# Patient Record
Sex: FEMALE | Race: BLACK OR AFRICAN AMERICAN | Employment: UNEMPLOYED | URBAN - METROPOLITAN AREA
[De-identification: names, ages, dates, MRNs, and addresses within clinical notes are randomized per-mention and may not be internally consistent; named-entity substitution may affect disease eponyms.]

---

## 2017-02-06 ENCOUNTER — DOCUMENTATION ONLY (OUTPATIENT)
Dept: FAMILY MEDICINE CLINIC | Age: 47
End: 2017-02-06

## 2017-02-06 NOTE — PROGRESS NOTES
Pt arrived with her SNAP form. Completed it as she is unable to work with her uncontrolled pain. Never received word regarding PAP application and methotrexate was DCd. Pt on no meds at this time. Has f/u with Dr Orly Cardoza in 2 days. I encouraged her to stay and complete PAP application for enbrel and leave with staff so we can fax form for her. She was in agreement.

## 2017-02-20 ENCOUNTER — APPOINTMENT (OUTPATIENT)
Dept: GENERAL RADIOLOGY | Age: 47
End: 2017-02-20
Attending: EMERGENCY MEDICINE
Payer: SELF-PAY

## 2017-02-20 ENCOUNTER — HOSPITAL ENCOUNTER (EMERGENCY)
Age: 47
Discharge: HOME | End: 2017-02-20
Attending: EMERGENCY MEDICINE
Payer: SELF-PAY

## 2017-02-20 VITALS
DIASTOLIC BLOOD PRESSURE: 110 MMHG | RESPIRATION RATE: 18 BRPM | OXYGEN SATURATION: 99 % | SYSTOLIC BLOOD PRESSURE: 180 MMHG | HEART RATE: 86 BPM | HEIGHT: 67 IN | BODY MASS INDEX: 45.99 KG/M2 | WEIGHT: 293 LBS | TEMPERATURE: 98.2 F

## 2017-02-20 DIAGNOSIS — M19.031 ARTHRITIS OF WRIST, RIGHT: Primary | ICD-10-CM

## 2017-02-20 PROCEDURE — 73130 X-RAY EXAM OF HAND: CPT

## 2017-02-20 PROCEDURE — 74011250637 HC RX REV CODE- 250/637: Performed by: EMERGENCY MEDICINE

## 2017-02-20 PROCEDURE — 99283 EMERGENCY DEPT VISIT LOW MDM: CPT

## 2017-02-20 RX ORDER — OXYCODONE AND ACETAMINOPHEN 5; 325 MG/1; MG/1
1 TABLET ORAL
Status: COMPLETED | OUTPATIENT
Start: 2017-02-20 | End: 2017-02-20

## 2017-02-20 RX ORDER — OXYCODONE AND ACETAMINOPHEN 5; 325 MG/1; MG/1
TABLET ORAL
Qty: 12 TAB | Refills: 0 | Status: SHIPPED | OUTPATIENT
Start: 2017-02-20 | End: 2017-07-24

## 2017-02-20 RX ADMIN — OXYCODONE HYDROCHLORIDE AND ACETAMINOPHEN 1 TABLET: 5; 325 TABLET ORAL at 16:51

## 2017-02-20 NOTE — ED TRIAGE NOTES
Injury to right hand where purse strap got caught on thumb pulling on thumb. Also c/o right knee pain and swelling and right foot pain and swelling. States hx arthritis, unknown gout.

## 2017-02-20 NOTE — ED NOTES
Current Discharge Medication List      CONTINUE these medications which have CHANGED    Details   oxyCODONE-acetaminophen (PERCOCET) 5-325 mg per tablet Take 1 tablet every 4-6 hours as needed for pain control. If you were instructed to try over the counter ibuprofen or tylenol, only take the percocet for pain not controlled with the over the counter medication. Qty: 12 Tab, Refills: 0         CONTINUE these medications which have NOT CHANGED    Details   ! ! predniSONE (DELTASONE) 20 mg tablet Take 3 tabs by mouth daily for 5 days  Qty: 15 Tab, Refills: 0      ergocalciferol (VITAMIN D2) 50,000 unit capsule Take 1 Cap by mouth every seven (7) days. Indications: VITAMIN D DEFICIENCY (HIGH DOSE THERAPY)  Qty: 4 Cap, Refills: 1    Associated Diagnoses: Vitamin D deficiency      ! ! predniSONE (DELTASONE) 10 mg tablet Take 1 Tab by mouth daily (with breakfast). Qty: 10 Tab, Refills: 0      folic acid (FOLVITE) 1 mg tablet Take 1 Tab by mouth daily. Qty: 30 Tab, Refills: 5      Olmesartan-amLODIPine-HCTZ 20-5-12.5 mg tab Take  by mouth. !! - Potential duplicate medications found. Please discuss with provider.         Patient armband removed and shredded prescription given and reviewed with shawn

## 2017-02-20 NOTE — ED PROVIDER NOTES
HPI Comments: 3:12 PM Bunny Mckay is a 55 y.o. female with hx of HTN and arthritis who presents to the Emergency Department with c/o R hand pain and swelling. Pt reports some initial swelling in her R hand but states she grabbed her pocketbook and pulled her fingers backwards which has caused increased swelling and pain. Pt also c/o some R knee and foot pain onset today today but states she has experienced some stiffness in her joints prior due to hx of RA. Denies any recent injuries or trauma. No other complaints at this time. Patient is a 55 y.o. female presenting with knee pain, foot pain, and hand pain. The history is provided by the patient. Knee Pain    Pertinent negatives include no back pain. Foot Pain    Pertinent negatives include no back pain. Hand Pain    Pertinent negatives include no back pain. Past Medical History:   Diagnosis Date    Arthritis     Hypertension        Past Surgical History:   Procedure Laterality Date    Hx appendectomy  1998    Pr excise breast duct fistula  [id number]    Hx gyn       c/s    Hx heent       jaw abscess         Family History:   Problem Relation Age of Onset    Hypertension Father     Diabetes Maternal Aunt     Diabetes Paternal Aunt        Social History     Social History    Marital status: LEGALLY      Spouse name: N/A    Number of children: N/A    Years of education: N/A     Occupational History    Not on file. Social History Main Topics    Smoking status: Current Every Day Smoker     Packs/day: 0.25     Types: Cigarettes    Smokeless tobacco: Never Used    Alcohol use No    Drug use: No    Sexual activity: No     Other Topics Concern    Not on file     Social History Narrative         ALLERGIES: Iodinated contrast media - oral and iv dye; Norco [hydrocodone-acetaminophen]; and Penicillins    Review of Systems   Constitutional: Negative for chills, fatigue, fever and unexpected weight change.    HENT: Negative for congestion and rhinorrhea. Respiratory: Negative for chest tightness and shortness of breath. Cardiovascular: Negative for chest pain, palpitations and leg swelling. Gastrointestinal: Negative for abdominal pain, nausea and vomiting. Genitourinary: Negative for dysuria. Musculoskeletal: Positive for arthralgias (R hand, R knee and foot). Negative for back pain. Skin: Negative for rash. Neurological: Negative for dizziness and weakness. Psychiatric/Behavioral: The patient is not nervous/anxious. Vitals:    02/20/17 1328   BP: (!) 180/110   Pulse: 86   Resp: 18   Temp: 98.2 °F (36.8 °C)   SpO2: 99%   Weight: 145.2 kg (320 lb)   Height: 5' 7\" (1.702 m)            Physical Exam   Constitutional: She is oriented to person, place, and time. She appears well-developed and well-nourished. No distress. HENT:   Head: Normocephalic and atraumatic. Right Ear: External ear normal.   Left Ear: External ear normal.   Nose: Nose normal.   Mouth/Throat: Oropharynx is clear and moist.   Eyes: Conjunctivae and EOM are normal. Pupils are equal, round, and reactive to light. No scleral icterus. Neck: Normal range of motion. Neck supple. No JVD present. No tracheal deviation present. No thyromegaly present. Cardiovascular: Normal rate, regular rhythm, normal heart sounds and intact distal pulses. Exam reveals no gallop and no friction rub. No murmur heard. Pulmonary/Chest: Effort normal and breath sounds normal. She exhibits no tenderness. Abdominal: Soft. Bowel sounds are normal. She exhibits no distension. There is no tenderness. There is no rebound and no guarding. Musculoskeletal: Normal range of motion. R hand mildly diffuse TTP dorsally with mild swelling     Lymphadenopathy:     She has no cervical adenopathy. Neurological: She is alert and oriented to person, place, and time. No cranial nerve deficit.  Coordination normal.   Normal neurovascular exam in the fingers of R hand   No sensory loss, Gait normal, Motor 5/5   Skin: Skin is warm and dry. Psychiatric: She has a normal mood and affect. Her behavior is normal. Judgment and thought content normal.   Nursing note and vitals reviewed. MDM  Number of Diagnoses or Management Options  Diagnosis management comments: Results reviewed with pt, she agrees with dispo and F/U plan. Walter Arriola MD  4:20 PM         Amount and/or Complexity of Data Reviewed  Tests in the radiology section of CPT®: ordered and reviewed      ED Course       Procedures    Vitals:  Patient Vitals for the past 12 hrs:   Temp Pulse Resp BP SpO2   02/20/17 1328 98.2 °F (36.8 °C) 86 18 (!) 180/110 99 %       Medications ordered:   Medications - No data to display      Lab findings:  No results found for this or any previous visit (from the past 12 hour(s)). X-Ray, CT or other radiology findings or impressions:  XR HAND RT MIN 3 V   Final Result   EXAMINATION: Right hand 3 views     INDICATION: Right hand pain, swelling, no reported injury, reported rheumatoid  arthritis     COMPARISON: None     FINDINGS: 3 views of the right hand obtained. No acute fracture or subluxation. Joint spaces maintained. Equivocal erosion at the base of the fifth intercarpal  and base of the second middle phalanx. No soft tissue calcifications identified. Diffusely prominent soft tissues.     IMPRESSION  IMPRESSION:        1. No acute osseous findings. See details above. Progress notes, Consult notes or additional Procedure notes:   Discussed plans to d/c. All questions addressed. Disposition:  Diagnosis: No diagnosis found. Disposition: Discharge    Follow-up Information     None           Patient's Medications   Start Taking    No medications on file   Continue Taking    ERGOCALCIFEROL (VITAMIN D2) 50,000 UNIT CAPSULE    Take 1 Cap by mouth every seven (7) days.  Indications: VITAMIN D DEFICIENCY (HIGH DOSE THERAPY)    FOLIC ACID (FOLVITE) 1 MG TABLET Take 1 Tab by mouth daily. OLMESARTAN-AMLODIPINE-HCTZ (TRIBENZOR) 20-5-12.5 MG TAB    Take  by mouth. OLMESARTAN-AMLODIPINE-HCTZ 20-5-12.5 MG TAB    Take  by mouth. OXYCODONE-ACETAMINOPHEN (PERCOCET) 5-325 MG PER TABLET    Take 1 Tab by mouth every six (6) hours as needed for Pain. Max Daily Amount: 4 Tabs. PREDNISONE (DELTASONE) 10 MG TABLET    Take 1 Tab by mouth daily (with breakfast). PREDNISONE (DELTASONE) 20 MG TABLET    Take 3 tabs by mouth daily for 5 days   These Medications have changed    No medications on file   Stop Taking    No medications on file       Scribe Attestation  I, Segundo Mendieta, am scribing for, and in the presence of Lorraine Mcneill MD, 02/20/17, 3:20 PM    Signed by: Miladys Vasquez, 02/20/17, 3:20 PM    Physician Attestation  I personally performed the services described in the documentation, reviewed the documentation, as recorded by the scribe in my presence, and it accurately and completely records my words and actions.     Lorraine Mcneill MD

## 2017-02-20 NOTE — DISCHARGE INSTRUCTIONS
Arthritis: Care Instructions  Your Care Instructions  Arthritis, also called osteoarthritis, is a breakdown of the cartilage that cushions your joints. When the cartilage wears down, your bones rub against each other. This causes pain and stiffness. Many people have some arthritis as they age. Arthritis most often affects the joints of the spine, hands, hips, knees, or feet. You can take simple measures to protect your joints, ease your pain, and help you stay active. Follow-up care is a key part of your treatment and safety. Be sure to make and go to all appointments, and call your doctor if you are having problems. It's also a good idea to know your test results and keep a list of the medicines you take. How can you care for yourself at home? · Stay at a healthy weight. Being overweight puts extra strain on your joints. · Talk to your doctor or physical therapist about exercises that will help ease joint pain. ¨ Stretch. You may enjoy gentle forms of yoga to help keep your joints and muscles flexible. ¨ Walk instead of jog. Other types of exercise that are less stressful on the joints include riding a bicycle, swimming, or water exercise. ¨ Lift weights. Strong muscles help reduce stress on your joints. Stronger thigh muscles, for example, take some of the stress off of the knees and hips. Learn the right way to lift weights so you do not make joint pain worse. · Take your medicines exactly as prescribed. Call your doctor if you think you are having a problem with your medicine. · Take pain medicines exactly as directed. ¨ If the doctor gave you a prescription medicine for pain, take it as prescribed. ¨ If you are not taking a prescription pain medicine, ask your doctor if you can take an over-the-counter medicine. · Use a cane, crutch, walker, or another device if you need help to get around. These can help rest your joints.  You also can use other things to make life easier, such as a higher toilet seat and padded handles on kitchen utensils. · Do not sit in low chairs, which can make it hard to get up. · Put heat or cold on your sore joints as needed. Use whichever helps you most. You also can take turns with hot and cold packs. ¨ Apply heat 2 or 3 times a day for 20 to 30 minutes--using a heating pad, hot shower, or hot pack--to relieve pain and stiffness. ¨ Put ice or a cold pack on your sore joint for 10 to 20 minutes at a time. Put a thin cloth between the ice and your skin. When should you call for help? Call your doctor now or seek immediate medical care if:  · You have sudden swelling, warmth, or pain in any joint. · You have joint pain and a fever or rash. · You have such bad pain that you cannot use a joint. Watch closely for changes in your health, and be sure to contact your doctor if:  · You have mild joint symptoms that continue even with more than 6 weeks of care at home. · You have stomach pain or other problems with your medicine. Where can you learn more? Go to http://ashley-gunjan.info/. Enter X710 in the search box to learn more about \"Arthritis: Care Instructions. \"  Current as of: February 24, 2016  Content Version: 11.1  © 4712-1629 [employer]. Care instructions adapted under license by [employer] (which disclaims liability or warranty for this information). If you have questions about a medical condition or this instruction, always ask your healthcare professional. [place] [address] any warranty or liability for your use of this information.

## 2017-02-22 ENCOUNTER — OFFICE VISIT (OUTPATIENT)
Dept: FAMILY MEDICINE CLINIC | Age: 47
End: 2017-02-22

## 2017-02-22 VITALS
TEMPERATURE: 98.2 F | RESPIRATION RATE: 18 BRPM | OXYGEN SATURATION: 96 % | SYSTOLIC BLOOD PRESSURE: 164 MMHG | BODY MASS INDEX: 45.99 KG/M2 | DIASTOLIC BLOOD PRESSURE: 107 MMHG | WEIGHT: 293 LBS | HEART RATE: 96 BPM | HEIGHT: 67 IN

## 2017-02-22 DIAGNOSIS — M06.9 RHEUMATOID ARTHRITIS INVOLVING MULTIPLE SITES, UNSPECIFIED RHEUMATOID FACTOR PRESENCE: Primary | ICD-10-CM

## 2017-02-22 RX ORDER — PREDNISONE 20 MG/1
TABLET ORAL
Qty: 60 TAB | Refills: 1 | Status: SHIPPED | OUTPATIENT
Start: 2017-02-22 | End: 2017-07-24

## 2017-02-22 NOTE — PROGRESS NOTES
HISTORY OF PRESENT ILLNESS  Whitney Domínguez is a 55 y.o. female. Swelling   The history is provided by the patient. This is a chronic problem. Episode onset: Presents for f/u of rheumatoid arthritis. Recently, she has been having worsening R arm pain and R knee pain. She was seen in the ED and prescribed percocet w/o significant improvement. + swelling. Episode frequency: She is off methotrexate and last took prednisone in November. The problem has been gradually worsening. Nothing aggravates the symptoms. Nothing relieves the symptoms. ROS    Physical Exam   Constitutional: She appears well-developed and well-nourished. HENT:   Head: Normocephalic and atraumatic. Musculoskeletal: She exhibits edema and tenderness. Reduced ROM of R hand       ASSESSMENT and PLAN  RA:  Will start course of prednisone tapering to 20mg. Once symptoms are controlled, will leave her at 20mg/daily for now.

## 2017-02-22 NOTE — PROGRESS NOTES
No chief complaint on file. 1. Have you been to the ER, urgent care clinic since your last visit? Hospitalized since your last visit? Yes Where: [facility]    2. Have you seen or consulted any other health care providers outside of the [address] [place] [address] since your last visit? No    3. When was your last Mammogram, Pap smear, and/or Colon screening? Mammogram: Year: no Pap smear: year: no Colonoscopy: Year:n/a  PMH/FH/Social Hx reviewed and updated as needed      Applicable screenings reviewed and updated as needed  Medication reconciliation performed. Patient does not need medication refills. Health Maintenance reviewed.

## 2017-02-22 NOTE — PROGRESS NOTES
Thank you for returning your application for medication assistance. We will fax your application to the [facility] prescription , Cristobal Cobb. It may take anywhere from 3 to 6 weeks for your application to be approved. THE FIRST FILL OF YOUR MEDICINE WILL BE DELIVERED TO THE [facility] AUTOMATICALLY. CALL [phone] OR [phone]   TO ASK FOR REFILLS WHEN YOU HAVE ONE MONTH   OF MEDICINE LEFT. Think of it the same way as when you call your regular pharmacy to order your medicine refills. Discharge instructions reviewed with patient    Medication list and understanding of medications reviewed with patient. OTC and herbal medications reviewed and added to med list if applicable  Barriers to adherence assessed. Guidance given regarding new medications this visit, including reason for taking this medicine, and common side effects.     avs GIVEN TO PATIENT

## 2017-02-22 NOTE — MR AVS SNAPSHOT
Visit Information Date & Time Provider Department Dept. Phone Encounter #  
 2/22/2017 10:15 AM Teodora Nieves MD [address] [place] [address] [id number] Upcoming Health Maintenance Date Due  
 FOOT EXAM Q1 6/30/1980 MICROALBUMIN Q1 6/30/1980 EYE EXAM RETINAL OR DILATED Q1 6/30/1980 Pneumococcal 19-64 Medium Risk (1 of 1 - PPSV23) 6/30/1989 DTaP/Tdap/Td series (1 - Tdap) 6/30/1991 LIPID PANEL Q1 5/13/2014 INFLUENZA AGE 9 TO ADULT 8/1/2016 HEMOGLOBIN A1C Q6M 3/26/2017 PAP AKA CERVICAL CYTOLOGY 2/2/2019 Allergies as of 2/22/2017  Review Complete On: 2/22/2017 By: Vivian Burciaga Severity Noted Reaction Type Reactions Iodinated Contrast Media - Oral And Iv Dye  05/13/2013    Hives Norco [Hydrocodone-acetaminophen]  09/26/2016    Nausea Only Penicillins  05/13/2013    Nausea and Vomiting Current Immunizations  Never Reviewed No immunizations on file. Not reviewed this visit You Were Diagnosed With   
  
 Codes Comments Rheumatoid arthritis involving multiple sites, unspecified rheumatoid factor presence [facility]    -  Primary ICD-10-CM: M06.9 ICD-9-CM: 714.0 Vitals BP  
  
  
  
  
  
 (!) 164/107 BMI and BSA Data Body Mass Index Body Surface Area  
 53.25 kg/m 2 2.7 m 2 Preferred Pharmacy Pharmacy Name Phone [facility] [address] [place] [address] 39 Your Updated Medication List  
  
   
This list is accurate as of: 2/22/17  1:21 PM.  Always use your most recent med list.  
  
  
  
  
 ergocalciferol 50,000 unit capsule Commonly known as:  VITAMIN D2 Take 1 Cap by mouth every seven (7) days. Indications: VITAMIN D DEFICIENCY (HIGH DOSE THERAPY)  
  
 folic acid 1 mg tablet Commonly known as:  Google Take 1 Tab by mouth daily. Olmesartan-amLODIPine-HCTZ 20-5-12.5 mg Tab Take  by mouth. oxyCODONE-acetaminophen 5-325 mg per tablet Commonly known as:  PERCOCET Take 1 tablet every 4-6 hours as needed for pain control. If you were instructed to try over the counter ibuprofen or tylenol, only take the percocet for pain not controlled with the over the counter medication. * predniSONE 10 mg tablet Commonly known as:  Karime Pound Take 1 Tab by mouth daily (with breakfast). * predniSONE 20 mg tablet Commonly known as:  Karime Pound Take 3 tabs by mouth daily for 5 days * predniSONE 20 mg tablet Commonly known as:  Karime Pound Take 3 tabs by mouth daily for 1 week then 2 tabs daily for 1 week then 1 tab daily * Notice: This list has 3 medication(s) that are the same as other medications prescribed for you. Read the directions carefully, and ask your doctor or other care provider to review them with you. Prescriptions Sent to Pharmacy Refills  
 predniSONE (DELTASONE) 20 mg tablet 1 Sig: Take 3 tabs by mouth daily for 1 week then 2 tabs daily for 1 week then 1 tab daily Class: Normal  
 Pharmacy: [address] #: [phone] Introducing [facility]! Debi Keenan introduces [employer] patient portal. Now you can access parts of your medical record, email your doctor's office, and request medication refills online. 1. In your internet browser, go to [email or web address] 2. Click on the First Time User? Click Here link in the Sign In box. You will see the New Member Sign Up page. 3. Enter your [employer] Access Code exactly as it appears below. You will not need to use this code after youve completed the sign-up process. If you do not sign up before the expiration date, you must request a new code. · [employer] Access Code: [email or web address] Expires: 5/4/2017  1:26 PM 
 
4.  Enter the last four digits of your Social Security Number (xxxx) and Date of Birth (mm/dd/yyyy) as indicated and click Submit. You will be taken to the next sign-up page. 5. Create a [employer] ID. This will be your [employer] login ID and cannot be changed, so think of one that is secure and easy to remember. 6. Create a [employer] password. You can change your password at any time. 7. Enter your Password Reset Question and Answer. This can be used at a later time if you forget your password. 8. Enter your e-mail address. You will receive e-mail notification when new information is available in [address] 9. Click Sign Up. You can now view and download portions of your medical record. 10. Click the Download Summary menu link to download a portable copy of your medical information. If you have questions, please visit the Frequently Asked Questions section of the [employer] website. Remember, [employer] is NOT to be used for urgent needs. For medical emergencies, dial 911. Now available from your iPhone and Android! Please provide this summary of care documentation to your next provider. Your primary care clinician is listed as Phys Other. If you have any questions after today's visit, please call [phone].

## 2017-03-02 NOTE — LETTER
3/2/2017 2:02 PM 
 
Ms. Claudius Litten [address] Ms Sharla Perez, We have ordered your enbrel medication. This will be a once weekly injection that is mailed directly to your home. When it arrives, bring it to the [facility] and we will show you how to inject it. When you start the enbrel, you will stop the prednisone. Please call with any questions.   
 
Harry Smith MD

## 2017-03-02 NOTE — TELEPHONE ENCOUNTER
Received incoming fax from [employer] for enbrel    Med list and chart notes reviewed. Pharmacy Assistance Medication approved for direct mail to pt.     enbrel 50mg inject sq once weekly

## 2017-04-24 ENCOUNTER — HOSPITAL ENCOUNTER (OUTPATIENT)
Dept: LAB | Age: 47
Discharge: HOME | End: 2017-04-24

## 2017-04-24 ENCOUNTER — CLINICAL SUPPORT (OUTPATIENT)
Dept: FAMILY MEDICINE CLINIC | Age: 47
End: 2017-04-24

## 2017-04-24 DIAGNOSIS — M06.9 RHEUMATOID ARTHRITIS INVOLVING MULTIPLE SITES, UNSPECIFIED RHEUMATOID FACTOR PRESENCE: Primary | ICD-10-CM

## 2017-04-24 DIAGNOSIS — M06.9 RHEUMATOID ARTHRITIS INVOLVING MULTIPLE SITES, UNSPECIFIED RHEUMATOID FACTOR PRESENCE: ICD-10-CM

## 2017-04-24 PROCEDURE — 87389 HIV-1 AG W/HIV-1&-2 AB AG IA: CPT | Performed by: NURSE PRACTITIONER

## 2017-04-24 NOTE — PROGRESS NOTES
Discharge instructions reviewed with patient    Medication list and understanding of medications reviewed with patient. OTC and herbal medications reviewed and added to med list if applicable  Barriers to adherence assessed. Guidance given regarding new medications this visit, including reason for taking this medicine, and common side effects. Labs drawn and patient had forms filled out to her housing development. Instructed on her enbrel injections and is to come back in May.

## 2017-04-25 LAB — HIV 1+2 AB+HIV1 P24 AG SERPL QL IA: NON REACTIVE

## 2017-05-22 ENCOUNTER — OFFICE VISIT (OUTPATIENT)
Dept: FAMILY MEDICINE CLINIC | Age: 47
End: 2017-05-22

## 2017-05-22 ENCOUNTER — HOSPITAL ENCOUNTER (OUTPATIENT)
Dept: LAB | Age: 47
Discharge: HOME | End: 2017-05-22

## 2017-05-22 VITALS
TEMPERATURE: 98.2 F | DIASTOLIC BLOOD PRESSURE: 113 MMHG | WEIGHT: 293 LBS | OXYGEN SATURATION: 96 % | BODY MASS INDEX: 45.99 KG/M2 | HEIGHT: 67 IN | HEART RATE: 104 BPM | RESPIRATION RATE: 16 BRPM | SYSTOLIC BLOOD PRESSURE: 174 MMHG

## 2017-05-22 DIAGNOSIS — M06.9 RHEUMATOID ARTHRITIS INVOLVING MULTIPLE SITES, UNSPECIFIED RHEUMATOID FACTOR PRESENCE: ICD-10-CM

## 2017-05-22 DIAGNOSIS — I15.9 SECONDARY HYPERTENSION: ICD-10-CM

## 2017-05-22 DIAGNOSIS — E11.9 CONTROLLED TYPE 2 DIABETES MELLITUS WITHOUT COMPLICATION, WITHOUT LONG-TERM CURRENT USE OF INSULIN (HCC): ICD-10-CM

## 2017-05-22 DIAGNOSIS — Z00.00 REGULAR CHECK-UP: Primary | ICD-10-CM

## 2017-05-22 LAB
ALBUMIN SERPL BCP-MCNC: 3.3 G/DL (ref 3.4–5)
ALBUMIN/GLOB SERPL: 0.8 {RATIO} (ref 0.8–1.7)
ALP SERPL-CCNC: 76 U/L (ref 45–117)
ALT SERPL-CCNC: 18 U/L (ref 13–56)
ANION GAP BLD CALC-SCNC: 11 MMOL/L (ref 3–18)
AST SERPL W P-5'-P-CCNC: 11 U/L (ref 15–37)
BASOPHILS # BLD AUTO: 0 K/UL (ref 0–0.06)
BASOPHILS # BLD: 0 % (ref 0–2)
BILIRUB SERPL-MCNC: 0.5 MG/DL (ref 0.2–1)
BUN SERPL-MCNC: 7 MG/DL (ref 7–18)
BUN/CREAT SERPL: 7 (ref 12–20)
CALCIUM SERPL-MCNC: 9.2 MG/DL (ref 8.5–10.1)
CHLORIDE SERPL-SCNC: 103 MMOL/L (ref 100–108)
CHOLEST SERPL-MCNC: 246 MG/DL
CO2 SERPL-SCNC: 30 MMOL/L (ref 21–32)
CREAT SERPL-MCNC: 0.96 MG/DL (ref 0.6–1.3)
DIFFERENTIAL METHOD BLD: ABNORMAL
EOSINOPHIL # BLD: 0.1 K/UL (ref 0–0.4)
EOSINOPHIL NFR BLD: 2 % (ref 0–5)
ERYTHROCYTE [DISTWIDTH] IN BLOOD BY AUTOMATED COUNT: 19 % (ref 11.6–14.5)
EST. AVERAGE GLUCOSE BLD GHB EST-MCNC: 169 MG/DL
GLOBULIN SER CALC-MCNC: 4 G/DL (ref 2–4)
GLUCOSE POC: 170 MG/DL
GLUCOSE SERPL-MCNC: 162 MG/DL (ref 74–99)
HBA1C MFR BLD: 7.5 % (ref 4.2–5.6)
HCT VFR BLD AUTO: 38.5 % (ref 35–45)
HDLC SERPL-MCNC: 61 MG/DL (ref 40–60)
HDLC SERPL: 4 {RATIO} (ref 0–5)
HGB BLD-MCNC: 12 G/DL (ref 12–16)
LDLC SERPL CALC-MCNC: 157.6 MG/DL (ref 0–100)
LIPID PROFILE,FLP: ABNORMAL
LYMPHOCYTES # BLD AUTO: 37 % (ref 21–52)
LYMPHOCYTES # BLD: 1.5 K/UL (ref 0.9–3.6)
MCH RBC QN AUTO: 26.9 PG (ref 24–34)
MCHC RBC AUTO-ENTMCNC: 31.2 G/DL (ref 31–37)
MCV RBC AUTO: 86.3 FL (ref 74–97)
MONOCYTES # BLD: 0.3 K/UL (ref 0.05–1.2)
MONOCYTES NFR BLD AUTO: 8 % (ref 3–10)
NEUTS SEG # BLD: 2.1 K/UL (ref 1.8–8)
NEUTS SEG NFR BLD AUTO: 53 % (ref 40–73)
PLATELET # BLD AUTO: 191 K/UL (ref 135–420)
POTASSIUM SERPL-SCNC: 3.8 MMOL/L (ref 3.5–5.5)
PROT SERPL-MCNC: 7.3 G/DL (ref 6.4–8.2)
RBC # BLD AUTO: 4.46 M/UL (ref 4.2–5.3)
SODIUM SERPL-SCNC: 144 MMOL/L (ref 136–145)
TRIGL SERPL-MCNC: 137 MG/DL (ref ?–150)
TSH SERPL DL<=0.05 MIU/L-ACNC: 0.58 UIU/ML (ref 0.36–3.74)
VLDLC SERPL CALC-MCNC: 27.4 MG/DL
WBC # BLD AUTO: 3.9 K/UL (ref 4.6–13.2)

## 2017-05-22 PROCEDURE — 83036 HEMOGLOBIN GLYCOSYLATED A1C: CPT | Performed by: NURSE PRACTITIONER

## 2017-05-22 PROCEDURE — 80053 COMPREHEN METABOLIC PANEL: CPT | Performed by: NURSE PRACTITIONER

## 2017-05-22 PROCEDURE — 85025 COMPLETE CBC W/AUTO DIFF WBC: CPT | Performed by: NURSE PRACTITIONER

## 2017-05-22 PROCEDURE — 80061 LIPID PANEL: CPT | Performed by: NURSE PRACTITIONER

## 2017-05-22 PROCEDURE — 84443 ASSAY THYROID STIM HORMONE: CPT | Performed by: NURSE PRACTITIONER

## 2017-05-22 RX ORDER — DICLOFENAC SODIUM AND MISOPROSTOL 75; 200 MG/1; UG/1
1 TABLET, DELAYED RELEASE ORAL 2 TIMES DAILY
Qty: 60 TAB | Refills: 0 | Status: SHIPPED | COMMUNITY
Start: 2017-05-22 | End: 2017-06-29 | Stop reason: SDUPTHER

## 2017-05-22 RX ORDER — AMLODIPINE BESYLATE 10 MG/1
10 TABLET ORAL DAILY
Qty: 30 TAB | Refills: 0 | Status: SHIPPED | OUTPATIENT
Start: 2017-05-22 | End: 2017-07-05 | Stop reason: ALTCHOICE

## 2017-05-22 RX ORDER — LISINOPRIL AND HYDROCHLOROTHIAZIDE 20; 25 MG/1; MG/1
1 TABLET ORAL DAILY
Qty: 30 TAB | Refills: 0 | Status: SHIPPED | OUTPATIENT
Start: 2017-05-22 | End: 2017-07-05 | Stop reason: ALTCHOICE

## 2017-05-22 NOTE — PROGRESS NOTES
REGLA Izaguirre is a 55 y.o. female being seen today for has been on enbrel for 3 weeks. Feeling better since starting it. Also tried her friend's diclofenac which helped her pain as well.  she states that she ran out of her tribenzor and was unaware how to call for refills. She is worried he blood sugars have worsened. Past Medical History:   Diagnosis Date    Arthritis     Hypertension          ROS  Patient states that she is feeling well. Denies complaints of chest pain, shortness of breath, swelling of legs, dizziness or weakness. she denies nausea, vomiting or diarrhea. Current Outpatient Prescriptions   Medication Sig    lisinopril-hydroCHLOROthiazide (PRINZIDE, ZESTORETIC) 20-25 mg per tablet Take 1 Tab by mouth daily.  lisinopril-hydroCHLOROthiazide (PRINZIDE, ZESTORETIC) 20-25 mg per tablet Take 1 Tab by mouth daily.  amLODIPine (NORVASC) 10 mg tablet Take 1 Tab by mouth daily.  diclofenac-miSOPROStol (ARTHROTEC 75)  mg-mcg per tablet Take 1 Tab by mouth two (2) times a day.  etanercept (ENBREL) 50 mg/mL (0.98 mL) injection 1 mL by SubCUTAneous route every seven (7) days.  predniSONE (DELTASONE) 20 mg tablet Take 3 tabs by mouth daily for 1 week then 2 tabs daily for 1 week then 1 tab daily    oxyCODONE-acetaminophen (PERCOCET) 5-325 mg per tablet Take 1 tablet every 4-6 hours as needed for pain control. If you were instructed to try over the counter ibuprofen or tylenol, only take the percocet for pain not controlled with the over the counter medication.  predniSONE (DELTASONE) 20 mg tablet Take 3 tabs by mouth daily for 5 days    ergocalciferol (VITAMIN D2) 50,000 unit capsule Take 1 Cap by mouth every seven (7) days. Indications: VITAMIN D DEFICIENCY (HIGH DOSE THERAPY)    predniSONE (DELTASONE) 10 mg tablet Take 1 Tab by mouth daily (with breakfast).  folic acid (FOLVITE) 1 mg tablet Take 1 Tab by mouth daily.     Olmesartan-amLODIPine-HCTZ 20-5-12.5 mg tab Take  by mouth. No current facility-administered medications for this visit. PE  Visit Vitals    BP (!) 174/113 (BP 1 Location: Left arm, BP Patient Position: Sitting)    Pulse (!) 104    Temp 98.2 °F (36.8 °C) (Oral)    Resp 16    Ht 5' 7\" (1.702 m)    Wt 343 lb (155.6 kg)    LMP 02/08/2017    SpO2 96%    BMI 53.72 kg/m2        Alert and oriented with normal mood and affect. she is well developed and well nourished . Lungs are clear without wheezing. Heart rate is regular without murmurs or gallops. There is no lower extremity edema. Results for orders placed or performed in visit on 05/22/17   AMB POC GLUCOSE BLOOD, BY GLUCOSE MONITORING DEVICE   Result Value Ref Range    Glucose  mg/dL         Assessment and Plan:        ICD-10-CM ICD-9-CM    1. Regular check-up Z00.00 V70.0 AMB POC GLUCOSE BLOOD, BY GLUCOSE MONITORING DEVICE   2. Controlled type 2 diabetes mellitus without complication, without long-term current use of insulin (HCC) E11.9 250.00 TSH 3RD GENERATION      HEMOGLOBIN A1C WITH EAG      LIPID PANEL   3. Secondary hypertension I15.9 405.99 lisinopril-hydroCHLOROthiazide (PRINZIDE, ZESTORETIC) 20-25 mg per tablet      amLODIPine (NORVASC) 10 mg tablet   4.  Rheumatoid arthritis involving multiple sites, unspecified rheumatoid factor presence (HCC) M06.9 714.0 CBC WITH AUTOMATED DIFF      METABOLIC PANEL, COMPREHENSIVE     Labs to evaluate diabetes  Cont enbrel and ok to start arthrotec prn pain  Lisinopril/hctz and amlodipine RX until refill request for tribenzor to PAP can be made  F/u1 month    Kevin Eisenberg NP

## 2017-05-22 NOTE — PROGRESS NOTES
Discharge instructions reviewed with patient:    Arthrotec 75 mg/200 mcg #60 tab sample given by provider to take one tab by mouth bid  Lisinopril/HCTZ to be [facility] at [facility]  Amlodipine to be filled at [facility] with Good Rx coupon given  Return to clinic in 8 weeks or sooner prn. Medication list and understanding of medications reviewed with patient. OTC and herbal medications reviewed and added to med list if applicable  Barriers to adherence assessed. Guidance given regarding new medications this visit, including reason for taking this medicine, and common side effects.

## 2017-05-22 NOTE — MR AVS SNAPSHOT
Visit Information Date & Time Provider Department Dept. Phone Encounter #  
 5/22/2017  1:30 PM Venus Conway  [address] [phone] [id number] Upcoming Health Maintenance Date Due  
 FOOT EXAM Q1 6/30/1980 MICROALBUMIN Q1 6/30/1980 EYE EXAM RETINAL OR DILATED Q1 6/30/1980 Pneumococcal 19-64 Medium Risk (1 of 1 - PPSV23) 6/30/1989 DTaP/Tdap/Td series (1 - Tdap) 6/30/1991 LIPID PANEL Q1 5/13/2014 HEMOGLOBIN A1C Q6M 3/26/2017 INFLUENZA AGE 9 TO ADULT 8/1/2017 PAP AKA CERVICAL CYTOLOGY 2/2/2019 Allergies as of 5/22/2017  Review Complete On: 5/22/2017 By: Keri Harding Severity Noted Reaction Type Reactions Iodinated Contrast Media - Oral And Iv Dye  05/13/2013    Hives Norco [Hydrocodone-acetaminophen]  09/26/2016    Nausea Only Penicillins  05/13/2013    Nausea and Vomiting Current Immunizations  Never Reviewed No immunizations on file. Not reviewed this visit You Were Diagnosed With   
  
 Codes Comments Regular check-up    -  Primary ICD-10-CM: Z00.00 ICD-9-CM: V70.0 Controlled type 2 diabetes mellitus without complication, without long-term current use of insulin ([facility]     ICD-10-CM: E11.9 ICD-9-CM: 250.00 Secondary hypertension     ICD-10-CM: I15.9 ICD-9-CM: 405.99 Rheumatoid arthritis involving multiple sites, unspecified rheumatoid factor presence ([facility].)     ICD-10-CM: M06.9 ICD-9-CM: 714.0 Vitals BP Pulse Temp Resp Height(growth percentile) Weight(growth percentile) (!) 174/113 (BP 1 Location: Left arm, BP Patient Position: Sitting) (!) 104 98.2 °F (36.8 °C) (Oral) 16 5' 7\" (1.702 m) 343 lb (155.6 kg) LMP SpO2 BMI OB Status Smoking Status 02/08/2017 96% 53.72 kg/m2 Having regular periods Current Every Day Smoker Vitals History BMI and BSA Data Body Mass Index Body Surface Area 53.72 kg/m 2 2.71 m 2 Preferred Pharmacy Pharmacy Name Phone [facility], [address] [phone] Your Updated Medication List  
  
   
This list is accurate as of: 5/22/17  2:33 PM.  Always use your most recent med list. amLODIPine 10 mg tablet Commonly known as:  Sherald Burkitt Take 1 Tab by mouth daily. diclofenac-miSOPROStol  mg-mcg per tablet Commonly known as:  ARTHROTEC 75 Take 1 Tab by mouth two (2) times a day. ergocalciferol 50,000 unit capsule Commonly known as:  VITAMIN D2 Take 1 Cap by mouth every seven (7) days. Indications: VITAMIN D DEFICIENCY (HIGH DOSE THERAPY) etanercept 50 mg/mL (0.98 mL) injection Commonly known as:  ENBREL  
1 mL by SubCUTAneous route every seven (7) days. folic acid 1 mg tablet Commonly known as:  [employer] Take 1 Tab by mouth daily. * lisinopril-hydroCHLOROthiazide 20-25 mg per tablet Commonly known as:  Digna Rower Take 1 Tab by mouth daily. * lisinopril-hydroCHLOROthiazide 20-25 mg per tablet Commonly known as:  Digna Rower Take 1 Tab by mouth daily. Olmesartan-amLODIPine-HCTZ 20-5-12.5 mg Tab Take  by mouth. oxyCODONE-acetaminophen 5-325 mg per tablet Commonly known as:  PERCOCET Take 1 tablet every 4-6 hours as needed for pain control. If you were instructed to try over the counter ibuprofen or tylenol, only take the percocet for pain not controlled with the over the counter medication. * predniSONE 10 mg tablet Commonly known as:  Sofiya Sake Take 1 Tab by mouth daily (with breakfast). * predniSONE 20 mg tablet Commonly known as:  Sofiya Sake Take 3 tabs by mouth daily for 5 days * predniSONE 20 mg tablet Commonly known as:  Sofiya Sake Take 3 tabs by mouth daily for 1 week then 2 tabs daily for 1 week then 1 tab daily * Notice:   This list has 5 medication(s) that are the same as other medications prescribed for you. Read the directions carefully, and ask your doctor or other care provider to review them with you. Prescriptions Sent to Pharmacy Refills  
 lisinopril-hydroCHLOROthiazide (PRINZIDE, ZESTORETIC) 20-25 mg per tablet 0 Sig: Take 1 Tab by mouth daily. Class: Normal  
 Pharmacy: [id number] Ph #: [phone] Route: Oral  
 lisinopril-hydroCHLOROthiazide (PRINZIDE, ZESTORETIC) 20-25 mg per tablet 0 Sig: Take 1 Tab by mouth daily. Class: Normal  
 Pharmacy: [id number] Ph #: [phone] Route: Oral  
 amLODIPine (NORVASC) 10 mg tablet 0 Sig: Take 1 Tab by mouth daily. Class: Normal  
 Pharmacy: [facility] [address] Ph #: [phone] Route: Oral  
  
We Performed the Following AMB POC GLUCOSE BLOOD, BY GLUCOSE MONITORING DEVICE [72462 CPT(R)] Introducing [facility]! [facility] introduces [employer] patient portal. Now you can access parts of your medical record, email your doctor's office, and request medication refills online. 1. In your internet browser, go to [email or web address] 2. Click on the First Time User? Click Here link in the Sign In box. You will see the New Member Sign Up page. 3. Enter your [employer] Access Code exactly as it appears below. You will not need to use this code after youve completed the sign-up process. If you do not sign up before the expiration date, you must request a new code. · [employer] Access Code: [email or web address] Expires: 8/20/2017  2:33 PM 
 
4. Enter the last four digits of your Social Security Number (xxxx) and Date of Birth (mm/dd/yyyy) as indicated and click Submit. You will be taken to the next sign-up page. 5. Create a [employer] ID.  This will be your [employer] login ID and cannot be changed, so think of one that is secure and easy to remember. 6. Create a [employer] password. You can change your password at any time. 7. Enter your Password Reset Question and Answer. This can be used at a later time if you forget your password. 8. Enter your e-mail address. You will receive e-mail notification when new information is available in [address] 9. Click Sign Up. You can now view and download portions of your medical record. 10. Click the Download Summary menu link to download a portable copy of your medical information. If you have questions, please visit the Frequently Asked Questions section of the [employer] website. Remember, [employer] is NOT to be used for urgent needs. For medical emergencies, dial 911. Now available from your iPhone and Android! Please provide this summary of care documentation to your next provider. Your primary care clinician is listed as Phys Other. If you have any questions after today's visit, please call [phone].

## 2017-05-22 NOTE — PROGRESS NOTES
No chief complaint on file. 1. Have you been to the ER, urgent care clinic since your last visit? Hospitalized since your last visit? No    2. Have you seen or consulted any other health care providers outside of the [facility] since your last visit? No    3. When was your last Pap smear? 2016  When was your last Mammogram?No   When was your last Colon screening? No    PMH/FH/Social Hx reviewed and updated as needed      Applicable screenings reviewed and updated as needed  Medication reconciliation performed. Patient does need medication refills. Health Maintenance reviewed.

## 2017-06-29 NOTE — LETTER
6/29/2017 12:38 PM 
 
Ms. Gil Forbes [address] [address] Thank you for participating in the [address] [place] [address] Patient Assistance Program. 
 
This is notification that your item(s) was delivered to us. Please  your item(s) between 11:00 am and 1:00 pm, at one of our [facility] sites within 14 days. When you arrive, you will need to register inside as a \"nurse visit\" to  your medication. Notify the registrar that you are there to  medication and they will register you as soon as possible. There may be a short wait but we try to make the process as fast as possible. If you fail to follow-up for regular appointments, the [address] [place] [address] may discontinue providing your medication. To see when the [address] will be in your neighborhood, go to 
[email or web address] 
or call [phone], select your language (1 for english or 2 for Panamanian), and then option 2. Sincerely, Terence Tran MD

## 2017-06-29 NOTE — TELEPHONE ENCOUNTER
[employer] sent patient Rachell Kaley [id number] 180 tabs through the PAP. Letter sent to patient for  and order routed to provider.

## 2017-07-03 RX ORDER — OLMESARTAN MEDOXOMIL, AMLODIPINE AND HYDROCHLOROTHIAZIDE TABLET 20/5/12.5 MG 20; 5; 12.5 MG/1; MG/1; MG/1
TABLET ORAL
Status: CANCELLED | OUTPATIENT
Start: 2017-07-03

## 2017-07-03 NOTE — LETTER
7/3/2017 10:27 AM 
 
Ms. Wiley Severance [address] [address] Thank you for participating in the [address] [place] [address] Patient Assistance Program. 
 
This is notification that your item(s) was delivered to us. Please  your item(s) between 11:00 am and 1:00 pm, at one of our [place] sites within 14 days. When you arrive, you will need to register inside as a \"nurse visit\" to  your medication. Notify the registrar that you are there to  medication and they will register you as soon as possible. There may be a short wait but we try to make the process as fast as possible. If you fail to follow-up for regular appointments, the [address] [place] [address] may discontinue providing your medication. To see when the [address] will be in your neighborhood, go to 
[email or web address] 
or call [phone], select your language (1 for english or 2 for Singaporean), and then option 2. Sincerely, Soha Tate MD

## 2017-07-03 NOTE — TELEPHONE ENCOUNTER
[employer] sent patient Geo Son 90 tabs of Tribenzor 20/5/12.5mg through the PAP. Letter sent to patient for  and order routed to provider.

## 2017-07-05 RX ORDER — OLMESARTAN MEDOXOMIL, AMLODIPINE AND HYDROCHLOROTHIAZIDE TABLET 20/5/12.5 MG 20; 5; 12.5 MG/1; MG/1; MG/1
1 TABLET ORAL DAILY
Qty: 90 TAB | Refills: 0 | Status: SHIPPED | COMMUNITY
Start: 2017-07-05 | End: 2017-08-21 | Stop reason: ALTCHOICE

## 2017-07-05 RX ORDER — DICLOFENAC SODIUM AND MISOPROSTOL 75; 200 MG/1; UG/1
1 TABLET, DELAYED RELEASE ORAL 2 TIMES DAILY
Qty: 180 TAB | Refills: 0 | Status: SHIPPED | COMMUNITY
Start: 2017-07-05

## 2017-07-05 NOTE — TELEPHONE ENCOUNTER
Med list and chart notes reviewed. Pharmacy Assistance Medication approved for pickup.     Arthrotec 75/200 mcg  One po bid prn

## 2017-07-05 NOTE — TELEPHONE ENCOUNTER
Med list and chart notes reviewed. Pharmacy Assistance Medication approved for pickup.    tribenzor 20/5/12.5 one po daily    Please ask pt to follow up for bp check after one month on med.      Also dc lisinopril hctz and amlodipine when starts tribenzor

## 2017-07-12 RX ORDER — LISINOPRIL AND HYDROCHLOROTHIAZIDE 20; 25 MG/1; MG/1
1 TABLET ORAL DAILY
Qty: 30 TAB | Refills: 1 | Status: SHIPPED | OUTPATIENT
Start: 2017-07-12 | End: 2017-07-24

## 2017-07-12 RX ORDER — AMLODIPINE BESYLATE 10 MG/1
10 TABLET ORAL DAILY
Qty: 30 TAB | Refills: 1 | Status: SHIPPED | OUTPATIENT
Start: 2017-07-12 | End: 2017-08-21 | Stop reason: ALTCHOICE

## 2017-07-12 NOTE — TELEPHONE ENCOUNTER
Pt has tribenzor on [place] but may run out of bp meds before she can .   Refill request routed to pcp

## 2017-07-21 ENCOUNTER — CLINICAL SUPPORT (OUTPATIENT)
Dept: FAMILY MEDICINE CLINIC | Age: 47
End: 2017-07-21

## 2017-07-21 DIAGNOSIS — I15.9 SECONDARY HYPERTENSION: ICD-10-CM

## 2017-07-21 DIAGNOSIS — M06.9 RHEUMATOID ARTHRITIS INVOLVING MULTIPLE SITES, UNSPECIFIED RHEUMATOID FACTOR PRESENCE: Primary | ICD-10-CM

## 2017-07-21 NOTE — PROGRESS NOTES
Per provider patient picked up Pharmacy Assistance Program medication. Medication: Arthrotec 75 - 3 Bottles 180 tablets  : Abdirizak Pal    Lot  [id number]          Exp 10/2019      Per provider patient picked up Pharmacy Assistance Program medication. Medication: Tribenzor - 3 bottles- 90 tablets  : Mounika Jaxon    Lot  [id number]          Exp 10/18    Patient verified understanding of medication and directions. Discharge instructions reviewed with patient. Medication list and understanding of medications reviewed with patient. OTC and herbal medications reviewed and added to med list if applicable  Barriers to adherence assessed. Patient verified understanding of medication and directions. Guidance given regarding new medications this visit, including reason for taking this medicine, and common side effects. Medications given to patient by Angel Luis Junior RN.

## 2017-07-24 ENCOUNTER — HOSPITAL ENCOUNTER (OUTPATIENT)
Dept: LAB | Age: 47
Discharge: HOME | End: 2017-07-24

## 2017-07-24 ENCOUNTER — OFFICE VISIT (OUTPATIENT)
Dept: FAMILY MEDICINE CLINIC | Age: 47
End: 2017-07-24

## 2017-07-24 VITALS
RESPIRATION RATE: 22 BRPM | HEIGHT: 67 IN | SYSTOLIC BLOOD PRESSURE: 139 MMHG | DIASTOLIC BLOOD PRESSURE: 100 MMHG | OXYGEN SATURATION: 98 % | HEART RATE: 83 BPM | TEMPERATURE: 97 F | WEIGHT: 293 LBS | BODY MASS INDEX: 45.99 KG/M2

## 2017-07-24 DIAGNOSIS — M06.9 RHEUMATOID ARTHRITIS INVOLVING MULTIPLE SITES, UNSPECIFIED RHEUMATOID FACTOR PRESENCE: ICD-10-CM

## 2017-07-24 DIAGNOSIS — Z51.81 MEDICATION MONITORING ENCOUNTER: Primary | ICD-10-CM

## 2017-07-24 DIAGNOSIS — Z51.81 MEDICATION MONITORING ENCOUNTER: ICD-10-CM

## 2017-07-24 LAB
ALBUMIN SERPL BCP-MCNC: 3.3 G/DL (ref 3.4–5)
ALBUMIN/GLOB SERPL: 0.8 {RATIO} (ref 0.8–1.7)
ALP SERPL-CCNC: 79 U/L (ref 45–117)
ALT SERPL-CCNC: 21 U/L (ref 13–56)
ANION GAP BLD CALC-SCNC: 10 MMOL/L (ref 3–18)
AST SERPL W P-5'-P-CCNC: 17 U/L (ref 15–37)
BASOPHILS # BLD AUTO: 0 K/UL (ref 0–0.06)
BASOPHILS # BLD: 0 % (ref 0–2)
BILIRUB SERPL-MCNC: 0.6 MG/DL (ref 0.2–1)
BUN SERPL-MCNC: 12 MG/DL (ref 7–18)
BUN/CREAT SERPL: 15 (ref 12–20)
CALCIUM SERPL-MCNC: 9.2 MG/DL (ref 8.5–10.1)
CHLORIDE SERPL-SCNC: 104 MMOL/L (ref 100–108)
CO2 SERPL-SCNC: 26 MMOL/L (ref 21–32)
CREAT SERPL-MCNC: 0.78 MG/DL (ref 0.6–1.3)
DIFFERENTIAL METHOD BLD: ABNORMAL
EOSINOPHIL # BLD: 0.1 K/UL (ref 0–0.4)
EOSINOPHIL NFR BLD: 2 % (ref 0–5)
ERYTHROCYTE [DISTWIDTH] IN BLOOD BY AUTOMATED COUNT: 16.5 % (ref 11.6–14.5)
GLOBULIN SER CALC-MCNC: 4.1 G/DL (ref 2–4)
GLUCOSE SERPL-MCNC: 113 MG/DL (ref 74–99)
HCT VFR BLD AUTO: 39.3 % (ref 35–45)
HGB BLD-MCNC: 12.5 G/DL (ref 12–16)
LYMPHOCYTES # BLD AUTO: 38 % (ref 21–52)
LYMPHOCYTES # BLD: 1.3 K/UL (ref 0.9–3.6)
MCH RBC QN AUTO: 27.7 PG (ref 24–34)
MCHC RBC AUTO-ENTMCNC: 31.8 G/DL (ref 31–37)
MCV RBC AUTO: 86.9 FL (ref 74–97)
MONOCYTES # BLD: 0.5 K/UL (ref 0.05–1.2)
MONOCYTES NFR BLD AUTO: 13 % (ref 3–10)
NEUTS SEG # BLD: 1.6 K/UL (ref 1.8–8)
NEUTS SEG NFR BLD AUTO: 47 % (ref 40–73)
PLATELET # BLD AUTO: 162 K/UL (ref 135–420)
POTASSIUM SERPL-SCNC: 4.1 MMOL/L (ref 3.5–5.5)
PROT SERPL-MCNC: 7.4 G/DL (ref 6.4–8.2)
RBC # BLD AUTO: 4.52 M/UL (ref 4.2–5.3)
SODIUM SERPL-SCNC: 140 MMOL/L (ref 136–145)
WBC # BLD AUTO: 3.4 K/UL (ref 4.6–13.2)

## 2017-07-24 PROCEDURE — 80053 COMPREHEN METABOLIC PANEL: CPT | Performed by: NURSE PRACTITIONER

## 2017-07-24 PROCEDURE — 85025 COMPLETE CBC W/AUTO DIFF WBC: CPT | Performed by: NURSE PRACTITIONER

## 2017-07-24 NOTE — PROGRESS NOTES
HPI  Jose Gordon is a 52 y.o. female being seen today for feeling like the enbrel is not helping her pain. This past has a few year history with sudden onset body pain, hand swelling and saw Dr Doreen Araujo (Rheumatologist) and she took 10 MTX weekly which did not help. Has had the most releif from prednisone and diclofenac combination but now has diabetes and understands she cannot continue prednisone. Hands swell daily and and has pain in multiple joints. Chief Complaint   Patient presents with    Follow Up Chronic Condition     arthritis follow up and lab results   . she states that she does not want to take medication for her diabetes at this time and has cut out soda to help her blood sugars. Wanting to discuss other medications that are safer to take. Past Medical History:   Diagnosis Date    Arthritis     Hypertension          ROS  Patient states that she is feeling well. Denies complaints of chest pain, shortness of breath, swelling of legs, dizziness or weakness. she denies nausea, vomiting or diarrhea. Current Outpatient Prescriptions   Medication Sig    amLODIPine (NORVASC) 10 mg tablet Take 1 Tab by mouth daily.  diclofenac-miSOPROStol (ARTHROTEC 75)  mg-mcg per tablet Take 1 Tab by mouth two (2) times a day.  etanercept (ENBREL) 50 mg/mL (0.98 mL) injection 1 mL by SubCUTAneous route every seven (7) days.  Olmesartan-amLODIPine-HCTZ 20-5-12.5 mg tab Take 1 Tab by mouth daily.  ergocalciferol (VITAMIN D2) 50,000 unit capsule Take 1 Cap by mouth every seven (7) days. Indications: VITAMIN D DEFICIENCY (HIGH DOSE THERAPY)     No current facility-administered medications for this visit.         PE  Visit Vitals    BP (!) 139/100 (BP 1 Location: Left arm, BP Patient Position: Sitting)    Pulse 83    Temp 97 °F (36.1 °C) (Oral)    Resp 22    Ht 5' 7\" (1.702 m)    Wt 343 lb (155.6 kg)    LMP 06/30/2017 (Approximate)    SpO2 98%    BMI 53.72 kg/m2        Alert and oriented with normal mood and affect. she is well developed and well nourished . Lungs are clear without wheezing. Heart rate is regular without murmurs or gallops. There is no lower extremity edema. Generalized finger swelling noted. Results for orders placed or performed during the hospital encounter of 05/22/17   CBC WITH AUTOMATED DIFF   Result Value Ref Range    WBC 3.9 (L) 4.6 - 13.2 K/uL    RBC 4.46 4.20 - 5.30 M/uL    HGB 12.0 12.0 - 16.0 g/dL    HCT 38.5 35.0 - 45.0 %    MCV 86.3 74.0 - 97.0 FL    MCH 26.9 24.0 - 34.0 PG    MCHC 31.2 31.0 - 37.0 g/dL    RDW 19.0 (H) 11.6 - 14.5 %    PLATELET 566 677 - 188 K/uL    NEUTROPHILS 53 40 - 73 %    LYMPHOCYTES 37 21 - 52 %    MONOCYTES 8 3 - 10 %    EOSINOPHILS 2 0 - 5 %    BASOPHILS 0 0 - 2 %    ABS. NEUTROPHILS 2.1 1.8 - 8.0 K/UL    ABS. LYMPHOCYTES 1.5 0.9 - 3.6 K/UL    ABS. MONOCYTES 0.3 0.05 - 1.2 K/UL    ABS. EOSINOPHILS 0.1 0.0 - 0.4 K/UL    ABS. BASOPHILS 0.0 0.0 - 0.06 K/UL    DF AUTOMATED     METABOLIC PANEL, COMPREHENSIVE   Result Value Ref Range    Sodium 144 136 - 145 mmol/L    Potassium 3.8 3.5 - 5.5 mmol/L    Chloride 103 100 - 108 mmol/L    CO2 30 21 - 32 mmol/L    Anion gap 11 3.0 - 18 mmol/L    Glucose 162 (H) 74 - 99 mg/dL    BUN 7 7.0 - 18 MG/DL    Creatinine 0.96 0.6 - 1.3 MG/DL    BUN/Creatinine ratio 7 (L) 12 - 20      GFR est AA >60 >60 ml/min/1.73m2    GFR est non-AA >60 >60 ml/min/1.73m2    Calcium 9.2 8.5 - 10.1 MG/DL    Bilirubin, total 0.5 0.2 - 1.0 MG/DL    ALT (SGPT) 18 13 - 56 U/L    AST (SGOT) 11 (L) 15 - 37 U/L    Alk.  phosphatase 76 45 - 117 U/L    Protein, total 7.3 6.4 - 8.2 g/dL    Albumin 3.3 (L) 3.4 - 5.0 g/dL    Globulin 4.0 2.0 - 4.0 g/dL    A-G Ratio 0.8 0.8 - 1.7     TSH 3RD GENERATION   Result Value Ref Range    TSH 0.58 0.36 - 3.74 uIU/mL   HEMOGLOBIN A1C WITH EAG   Result Value Ref Range    Hemoglobin A1c 7.5 (H) 4.2 - 5.6 %    Est. average glucose 169 mg/dL   LIPID PANEL   Result Value Ref Range    LIPID PROFILE Cholesterol, total 246 (H) <200 MG/DL    Triglyceride 137 <150 MG/DL    HDL Cholesterol 61 (H) 40 - 60 MG/DL    LDL, calculated 157.6 (H) 0 - 100 MG/DL    VLDL, calculated 27.4 MG/DL    CHOL/HDL Ratio 4.0 0 - 5.0           Assessment and Plan:        ICD-10-CM ICD-9-CM    1. Medication monitoring encounter M43.61 V20.07 METABOLIC PANEL, COMPREHENSIVE      CBC WITH AUTOMATED DIFF   2.  Rheumatoid arthritis involving multiple sites, unspecified rheumatoid factor presence ([facility] M06.9 714.0    refer to [facility] to discuss further care - pt tried MTX, enbrel will need specialty care for pain magalys Khan NP

## 2017-07-24 NOTE — PROGRESS NOTES
Chief Complaint   Patient presents with    Follow Up Chronic Condition     1. Have you been to the ER, urgent care clinic since your last visit? Hospitalized since your last visit? No    2. Have you seen or consulted any other health care providers outside of the [address] [place] [address] since your last visit? No    3. When was your last Pap smear? Record - up to date  When was your last Mammogram? [address]  When was your last Colon screening? N/A    PMH/FH/Social Hx reviewed and updated as needed      Applicable screenings reviewed and updated as needed  Medication reconciliation performed. Patient does not need medication refills. Health Maintenance reviewed.

## 2017-07-24 NOTE — MR AVS SNAPSHOT
Visit Information Date & Time Provider Department Dept. Phone Encounter #  
 7/24/2017 10:30 AM Shailesh Ang NP Yumi Shaper [phone] [id number] Upcoming Health Maintenance Date Due  
 FOOT EXAM Q1 6/30/1980 MICROALBUMIN Q1 6/30/1980 EYE EXAM RETINAL OR DILATED Q1 6/30/1980 Pneumococcal 19-64 Medium Risk (1 of 1 - PPSV23) 6/30/1989 DTaP/Tdap/Td series (1 - Tdap) 6/30/1991 INFLUENZA AGE 9 TO ADULT 8/1/2017 HEMOGLOBIN A1C Q6M 11/22/2017 LIPID PANEL Q1 5/22/2018 PAP AKA CERVICAL CYTOLOGY 2/2/2019 Allergies as of 7/24/2017  Review Complete On: 7/24/2017 By: Carol Napier Severity Noted Reaction Type Reactions Iodinated Contrast- Oral And Iv Dye  05/13/2013    Hives Norco [Hydrocodone-acetaminophen]  09/26/2016    Nausea Only Penicillins  05/13/2013    Nausea and Vomiting Current Immunizations  Never Reviewed No immunizations on file. Not reviewed this visit You Were Diagnosed With   
  
 Codes Comments Medication monitoring encounter    -  Primary ICD-10-CM: Z51.81 
ICD-9-CM: V58.83 Rheumatoid arthritis involving multiple sites, unspecified rheumatoid factor presence ([facility] [[email or web address]] 75.)     ICD-10-CM: M06.9 ICD-9-CM: 714.0 Vitals BP Pulse Temp Resp Height(growth percentile) Weight(growth percentile) (!) 139/100 (BP 1 Location: Left arm, BP Patient Position: Sitting) 83 97 °F (36.1 °C) (Oral) 22 5' 7\" (1.702 m) 343 lb (155.6 kg) LMP SpO2 BMI OB Status Smoking Status 06/30/2017 (Approximate) 98% 53.72 kg/m2 Unknown Current Every Day Smoker BMI and BSA Data Body Mass Index Body Surface Area 53.72 kg/m 2 2.71 m 2 Preferred Pharmacy Pharmacy Name Phone [facility] [address] [place] [address] [phone] Your Updated Medication List  
  
   
This list is accurate as of: 7/24/17 11:49 AM.  Always use your most recent med list.  
  
  
  
  
 amLODIPine 10 mg tablet Commonly known as:  Shaneka Zach Take 1 Tab by mouth daily. diclofenac-miSOPROStol  mg-mcg per tablet Commonly known as:  ARTHROTEC 75 Take 1 Tab by mouth two (2) times a day. ergocalciferol 50,000 unit capsule Commonly known as:  VITAMIN D2 Take 1 Cap by mouth every seven (7) days. Indications: VITAMIN D DEFICIENCY (HIGH DOSE THERAPY) etanercept 50 mg/mL (0.98 mL) injection Commonly known as:  ENBREL  
1 mL by SubCUTAneous route every seven (7) days. Olmesartan-amLODIPine-HCTZ 20-5-12.5 mg Tab Take 1 Tab by mouth daily. Introducing [facility]! [place] introduces [employer] patient portal. Now you can access parts of your medical record, email your doctor's office, and request medication refills online. 1. In your internet browser, go to [email or web address] [email or web address] 2. Click on the First Time User? Click Here link in the Sign In box. You will see the New Member Sign Up page. 3. Enter your [employer] Access Code exactly as it appears below. You will not need to use this code after youve completed the sign-up process. If you do not sign up before the expiration date, you must request a new code. · [employer] Access Code: [email or web address] Expires: 8/20/2017  2:33 PM 
 
4. Enter the last four digits of your Social Security Number (xxxx) and Date of Birth (mm/dd/yyyy) as indicated and click Submit. You will be taken to the next sign-up page. 5. Create a [employer] ID. This will be your [employer] login ID and cannot be changed, so think of one that is secure and easy to remember. 6. Create a [employer] password. You can change your password at any time. 7. Enter your Password Reset Question and Answer. This can be used at a later time if you forget your password. 8. Enter your e-mail address. You will receive e-mail notification when new information is available in [address] 9. Click Sign Up. You can now view and download portions of your medical record. 10. Click the Download Summary menu link to download a portable copy of your medical information. If you have questions, please visit the Frequently Asked Questions section of the [employer] website. Remember, [employer] is NOT to be used for urgent needs. For medical emergencies, dial 911. Now available from your iPhone and Android! Please provide this summary of care documentation to your next provider. Your primary care clinician is listed as Deysi Roberts. If you have any questions after today's visit, please call [phone].

## 2017-07-24 NOTE — PROGRESS NOTES
Discharge instructions reviewed with patient    Medication list and understanding of medications reviewed with patient. OTC and herbal medications reviewed and added to med list if applicable  Barriers to adherence assessed. Guidance given regarding new medications this visit, including reason for taking this medicine, and common side effects. Appt with rheumatologist DR ANA[facility]) will be scheduled when pt information has been faxed. Labs drawn.  AVS given to pt

## 2017-07-26 ENCOUNTER — DOCUMENTATION ONLY (OUTPATIENT)
Dept: FAMILY MEDICINE CLINIC | Age: 47
End: 2017-07-26

## 2017-08-14 NOTE — TELEPHONE ENCOUNTER
[employer] sent patient Akhila Flight 90 tabs of Caduet 10/40mg through the PAP. Letter sent to patient for  and order routed to provider.

## 2017-08-14 NOTE — LETTER
8/14/2017 12:28 PM 
 
Ms. Bryson Cevallos [address] [address] Thank you for participating in the [address] [place] [address] Patient Assistance Program. 
 
This is notification that your item(s) was delivered to us. Please  your item(s) between 11:00 am and 1:00 pm, at one of our [facility] sites within 14 days. When you arrive, you will need to register inside as a \"nurse visit\" to  your medication. Notify the registrar that you are there to  medication and they will register you as soon as possible. There may be a short wait but we try to make the process as fast as possible. If you fail to follow-up for regular appointments, the [address] [place] [address] may discontinue providing your medication. To see when the [address] will be in your neighborhood, go to 
[email or web address] 
or call [phone], select your language (1 for english or 2 for Italian), and then option 2. Sincerely, Selvin Fu MD

## 2017-08-16 NOTE — LETTER
8/16/2017 2:50 PM 
 
Ms. George Ribera [address] [address] Thank you for participating in the [address] [place] [address] Patient Assistance Program. 
 
This is notification that your item(s) was delivered to us. Please  your item(s) between 11:00 am and 1:00 pm, at one of our [place] sites within 14 days. When you arrive, you will need to register inside as a \"nurse visit\" to  your medication. Notify the registrar that you are there to  medication and they will register you as soon as possible. There may be a short wait but we try to make the process as fast as possible. If you fail to follow-up for regular appointments, the [address] [place] [address] may discontinue providing your medication. To see when the [address] will be in your neighborhood, go to 
[email or web address] 
or call [phone], select your language (1 for english or 2 for Jordanian), and then option 2. Sincerely, Dionna Trinidad MD

## 2017-08-16 NOTE — TELEPHONE ENCOUNTER
[employer] sent patient Sophie Hussein 90 tabs of Benicar HCT 20/12.5 through the PAP. Letter sent to patient for  and order routed to provider.

## 2017-08-21 RX ORDER — OLMESARTAN MEDOXOMIL AND HYDROCHLOROTHIAZIDE 20/12.5 20; 12.5 MG/1; MG/1
1 TABLET ORAL DAILY
Qty: 90 TAB | Refills: 0 | Status: SHIPPED | COMMUNITY
Start: 2017-08-21

## 2017-08-21 RX ORDER — AMLODIPINE BESYLATE AND ATORVASTATIN CALCIUM 10; 40 MG/1; MG/1
1 TABLET, FILM COATED ORAL DAILY
Qty: 90 TAB | Refills: 0 | Status: SHIPPED | COMMUNITY
Start: 2017-08-21

## 2017-08-21 NOTE — TELEPHONE ENCOUNTER
Med list and chart notes reviewed. Pharmacy Assistance Medication approved for pickup.     benicar hct 20/12.5 po daily

## 2017-08-21 NOTE — TELEPHONE ENCOUNTER
Med list and chart notes reviewed. Pharmacy Assistance Medication approved for pickup.     caduet 10/40 po daily  This will replace any amlodipine or tribenzor if she still has any

## 2017-10-24 RX ORDER — PREDNISONE 20 MG/1
20 TABLET ORAL
Refills: 0 | OUTPATIENT
Start: 2017-10-24

## 2017-10-24 NOTE — TELEPHONE ENCOUNTER
Incoming call from patient. Two patient identifiers confirmed. Patient requesting refill on   Requested Prescriptions     Pending Prescriptions Disp Refills    predniSONE (DELTASONE) 20 mg tablet  0     Sig: Take 1 Tab by mouth daily (with breakfast). According to patient she was previously prescribed Prednisone for her Rheumatoid Arthritis.

## 2017-10-25 NOTE — TELEPHONE ENCOUNTER
Outgoing call to patient this morning. Two patient identifiers confirmed. Informed patient of her medication refill refusal. Patient verbalized understanding. Appointment scheduled for this upcoming Fri Oct 27th.

## 2017-12-19 ENCOUNTER — TELEPHONE (OUTPATIENT)
Dept: FAMILY MEDICINE CLINIC | Age: 47
End: 2017-12-19